# Patient Record
Sex: FEMALE | Race: WHITE | URBAN - METROPOLITAN AREA
[De-identification: names, ages, dates, MRNs, and addresses within clinical notes are randomized per-mention and may not be internally consistent; named-entity substitution may affect disease eponyms.]

---

## 2019-09-12 ENCOUNTER — OFFICE VISIT (OUTPATIENT)
Dept: FAMILY MEDICINE | Facility: CLINIC | Age: 34
End: 2019-09-12

## 2019-09-12 VITALS
RESPIRATION RATE: 15 BRPM | WEIGHT: 149.8 LBS | DIASTOLIC BLOOD PRESSURE: 86 MMHG | HEART RATE: 90 BPM | OXYGEN SATURATION: 99 % | HEIGHT: 62 IN | BODY MASS INDEX: 27.57 KG/M2 | SYSTOLIC BLOOD PRESSURE: 124 MMHG

## 2019-09-12 DIAGNOSIS — R20.2 PARESTHESIAS: Primary | ICD-10-CM

## 2019-09-12 DIAGNOSIS — K59.00 CONSTIPATION, UNSPECIFIED CONSTIPATION TYPE: ICD-10-CM

## 2019-09-12 DIAGNOSIS — L65.9 HAIR LOSS: ICD-10-CM

## 2019-09-12 DIAGNOSIS — R68.89 COLD INTOLERANCE: ICD-10-CM

## 2019-09-12 DIAGNOSIS — R53.83 FATIGUE, UNSPECIFIED TYPE: ICD-10-CM

## 2019-09-12 LAB
% GRANULOCYTES: 61.8 % (ref 42.2–75.2)
HCT VFR BLD AUTO: 42.8 % (ref 35–46)
HEMOGLOBIN: 13.8 G/DL (ref 11.8–15.5)
LYMPHOCYTES NFR BLD AUTO: 31.4 % (ref 20.5–51.1)
MCH RBC QN AUTO: 30.2 PG (ref 27–31)
MCHC RBC AUTO-ENTMCNC: 32.2 G/DL (ref 33–37)
MCV RBC AUTO: 93.7 FL (ref 80–100)
MONOCYTES NFR BLD AUTO: 6.8 % (ref 1.7–9.3)
PLATELET # BLD AUTO: 285 K/UL (ref 140–450)
RBC # BLD AUTO: 4.57 X10/CMM (ref 3.7–5.2)
WBC # BLD AUTO: 8.7 X10/CMM (ref 3.8–11)

## 2019-09-12 PROCEDURE — 99214 OFFICE O/P EST MOD 30 MIN: CPT | Performed by: FAMILY MEDICINE

## 2019-09-12 PROCEDURE — 85025 COMPLETE CBC W/AUTO DIFF WBC: CPT | Performed by: FAMILY MEDICINE

## 2019-09-12 PROCEDURE — 36415 COLL VENOUS BLD VENIPUNCTURE: CPT | Performed by: FAMILY MEDICINE

## 2019-09-12 ASSESSMENT — PATIENT HEALTH QUESTIONNAIRE - PHQ9
5. POOR APPETITE OR OVEREATING: SEVERAL DAYS
SUM OF ALL RESPONSES TO PHQ QUESTIONS 1-9: 5

## 2019-09-12 ASSESSMENT — ANXIETY QUESTIONNAIRES
IF YOU CHECKED OFF ANY PROBLEMS ON THIS QUESTIONNAIRE, HOW DIFFICULT HAVE THESE PROBLEMS MADE IT FOR YOU TO DO YOUR WORK, TAKE CARE OF THINGS AT HOME, OR GET ALONG WITH OTHER PEOPLE: SOMEWHAT DIFFICULT
7. FEELING AFRAID AS IF SOMETHING AWFUL MIGHT HAPPEN: NOT AT ALL
3. WORRYING TOO MUCH ABOUT DIFFERENT THINGS: SEVERAL DAYS
GAD7 TOTAL SCORE: 3
5. BEING SO RESTLESS THAT IT IS HARD TO SIT STILL: NOT AT ALL
2. NOT BEING ABLE TO STOP OR CONTROL WORRYING: NOT AT ALL
6. BECOMING EASILY ANNOYED OR IRRITABLE: SEVERAL DAYS
1. FEELING NERVOUS, ANXIOUS, OR ON EDGE: NOT AT ALL

## 2019-09-12 NOTE — PROGRESS NOTES
"Problem(s) Oriented visit        SUBJECTIVE:                                                    Willow Rose is a 33 year old female who presents to clinic today for establishing care.    She also complains of ongoing symptoms of hair loss, difficulty regulating temperature, and peripheral neuropathy. She complains of \"nerve pain\" that can last just a few moments but occurs in various body parts and feels like needles jabbing her. Symptoms seem worse with stress. Sleep quality is described as \"okay\" but wakes commonly and can be up for 1-2 hours. Her hands can get very cold even on a warm day. She is fatigued always. She denies any heavy menses. She has very dry eyes and they are commonly bloodshot. She can have episodes of blurry vision and \"floaters.\" It has been a few years since her last vision test. No migraines, some infrequent tension headaches. She has been very good with diet and exercised and cannot lose any weight. She has had some episodes of vertigo that did resolve. She alternates between constipation and diarrhea.       Problem list, Medication list, Allergies, and Medical/Social/Surgical histories reviewed in EPIC and updated as appropriate.   Additional history: as documented    ROS:  5 point ROS completed and negative except noted above, including Gen, CV, Resp, GI, MS      Histories:   Patient Active Problem List   Diagnosis     Contraceptive management     CARDIOVASCULAR SCREENING; LDL GOAL LESS THAN 160     Past Surgical History:   Procedure Laterality Date     ORTHOPEDIC SURGERY  2000    fx rigth metarasals     ORTHOPEDIC SURGERY  2007    removal of ortho hardware rigth foot       Social History     Tobacco Use     Smoking status: Never Smoker     Smokeless tobacco: Never Used   Substance Use Topics     Alcohol use: Yes     Comment: 3-4 a month     Family History   Problem Relation Age of Onset     Hypertension Mother      Congenital Anomalies Brother      Thyroid Disease Maternal Aunt  " "    Thyroid Disease Maternal Aunt      Thyroid Disease Maternal Aunt      Diabetes Maternal Aunt            OBJECTIVE:                                                    /86   Pulse 90   Resp 15   Ht 1.575 m (5' 2\")   Wt 67.9 kg (149 lb 12.8 oz)   LMP 09/02/2019   SpO2 99%   BMI 27.40 kg/m    Body mass index is 27.4 kg/m .   GENERAL APPEARANCE: Alert, no acute distress  EYES: PERRL, EOM normal, conjunctiva and lids normal  HENT: Ears and TMs normal, oral mucosa and posterior oropharynx normal  NECK: No adenopathy,masses or thyromegaly  RESP: lungs clear to auscultation   CV: normal rate, regular rhythm, no murmur or gallop  ABDOMEN: soft, no organomegaly, masses or tenderness  MS: extremities normal, no peripheral edema  NEURO: Alert, oriented, speech and mentation normal, Cranial nerves 2-12 are normal., Strength normal and symmetrical in upper and lower extremities., Sensation is normal in UE and LE, negative Rhomberg, no pronator drift, normal rapid alternating movements, reflexes normal  PSYCH: mentation appears normal, affect and mood normal   Labs Resulted Today:   Results for orders placed or performed in visit on 09/12/19   Vitamin D  25-Hydroxy ([facility])   Result Value Ref Range    Vitamin D,25-Hydroxy 32.3 30.0 - 100.0 ng/mL    Narrative    Performed at:  01 - [facility] [address], [place], [place]  [id number]  : Kian Mays MD, Phone:  [phone]   Vitamin B12 ([facility])   Result Value Ref Range    Vitamin B12 533 232 - 1,245 pg/mL    Narrative    Performed at:  01 - [facility] [address], [place], [place]  [id number]  : Kian Mays MD, Phone:  [phone]   IgA+t-Lema ([facility])   Result Value Ref Range     87 - 352 mg/dL    Tissue Transglutaminase Antibody IgA <2 0 - 3 U/mL    Narrative    Performed at:  01 - [facility] [address], [place], [place]  [id number]  : Kian Mays MD, Phone:  [phone]  Performed at:  02 - " [facility]  [id number]  : Lisa Chong MD, Phone:  [phone]   TSH ([facility])   Result Value Ref Range    TSH 3.100 0.450 - 4.500 uIU/mL    Narrative    Performed at:  01 - [facility] [address], [place], [place]  [id number]  : Kian Mays MD, Phone:  [phone]   Thyroxine (T4) Free  Direct  S ([facility])   Result Value Ref Range    T4 Free 1.12 0.82 - 1.77 ng/dL    Narrative    Performed at:  01 - [facility] [address], [place], [place]  [id number]  : Kian Mays MD, Phone:  [phone]   Triiodothyronine (T3) ([facility])   Result Value Ref Range    Triiodothyronine (T3) 85 71 - 180 ng/dL    Narrative    Performed at:  01 - [facility] [address], [place], [place]  [id number]  : Kian Mays MD, Phone:  [phone]   CBC with Diff/Plt ([facility])   Result Value Ref Range    WBC x10/cmm 8.7 3.8 - 11.0 x10/cmm    % Lymphocytes 31.4 20.5 - 51.1 %    % Monocytes 6.8 1.7 - 9.3 %    % Granulocytes 61.8 42.2 - 75.2 %    RBC x10/cmm 4.57 3.7 - 5.2 x10/cmm    Hemoglobin 13.8 11.8 - 15.5 g/dl    Hematocrit 42.8 35 - 46 %    MCV 93.7 80 - 100 fL    MCH 30.2 27.0 - 31.0 pg    MCHC 32.2 (A) 33.0 - 37.0 g/dL    Platelet Count 285 140 - 450 K/uL   Iron and TIBC ([facility])   Result Value Ref Range    Iron Binding Cap 253 250 - 450 ug/dL    UIBC 196 131 - 425 ug/dL    Iron 57 27 - 159 ug/dL    Iron Saturation 23 15 - 55 %    Narrative    Performed at:  01 - [facility] [address], [place], [place]  [id number]  : Kian Mays MD, Phone:  [phone]   Comp. Metabolic Panel (14) ([facility])   Result Value Ref Range    Glucose 78 65 - 99 mg/dL    Urea Nitrogen 7 6 - 20 mg/dL    Creatinine 0.91 0.57 - 1.00 mg/dL    eGFR If NonAfricn Am 83 >59 mL/min/1.73    eGFR If Africn Am 96 >59 mL/min/1.73    BUN/Creatinine Ratio 8 (L) 9 - 23    Sodium 138 134 - 144 mmol/L    Potassium 4.0 3.5 - 5.2 mmol/L    Chloride 100 96 - 106  mmol/L    Total CO2 25 20 - 29 mmol/L    Calcium 9.1 8.7 - 10.2 mg/dL    Protein Total 6.3 6.0 - 8.5 g/dL    Albumin 4.0 3.5 - 5.5 g/dL    Globulin, Total 2.3 1.5 - 4.5 g/dL    A/G Ratio 1.7 1.2 - 2.2    Bilirubin Total <0.2 0.0 - 1.2 mg/dL    Alkaline Phosphatase 48 39 - 117 IU/L    AST 15 0 - 40 IU/L    ALT 10 0 - 32 IU/L    Narrative    Performed at:  01 - [facility] [address], [place], [place]  [id number]  : Kian Mays MD, Phone:  [phone]     ASSESSMENT/PLAN:                                                        Willow was seen today for new patient and consult.    Diagnoses and all orders for this visit:    Paresthesias  -     Vitamin B12 ([facility])  -     Comp. Metabolic Panel (14) ([facility])  -     Referral to [facility] for MRI of the brain.   Given her constellation of symptoms I am concerned about MS.    Fatigue, unspecified type  -     Vitamin D  25-Hydroxy ([facility])  -     CBC with Diff/Plt ([facility])  -     Iron and TIBC ([facility])  -     Comp. Metabolic Panel (14) ([facility])  Check for anemia, vitamin deficiency, iron deficiency.     Hair loss / Cold intolerance  -     TSH ([facility])  -     Thyroxine (T4) Free  Direct  S ([facility])  -     Triiodothyronine (T3) ([facility])  -     Comp. Metabolic Panel (14) ([facility])  Rule out thyroid disease, nutrition issue.    Constipation, unspecified constipation type  -     IgA+t-Lema ([facility])  Recommend increase water intake, increased fiber in diet, rule out celiac disease.      There are no Patient Instructions on file for this visit.    The following health maintenance items are reviewed in Epic and correct as of today:  Health Maintenance   Topic Date Due     HIV SCREENING  10/25/2000     HPV  10/25/2006     PREVENTIVE CARE VISIT  04/04/2012     PAP  04/04/2016     INFLUENZA VACCINE (1) 09/01/2019     DTAP/TDAP/TD IMMUNIZATION (7 - Td) 04/18/2026     PHQ-2  Completed     IPV IMMUNIZATION  Completed     MENINGITIS IMMUNIZATION  Aged Out        June Castellano MD  [facility]  [phone]    For any issues my office # is [phone]

## 2019-09-12 NOTE — LETTER
[facility]  [address] [place] [id number] [phone]      September 12, 2019      Willow Rose  [address]  [facility] [address]      EMERGENCY CARE PLAN  Presenting Problem Treatment Plan   Questions or concerns during clinic hours I will call the clinic directly:    [facility]  [address] [place], [place] [address] [phone]   Questions or concerns outside clinic hours I will call the after-hours on-call line at [phone]   Patient needs to schedule an appointment I will call the scheduling team during business hours at [phone]   Same day treatment  I will call the clinic first, then urgent care and express care if needed   Clinic Care Coordinators EMILIO Shafer:  [phone]  Leona Lowry RN: [phone]   Crisis Services:  Behavioral or Mental Health [facility]   [phone]   Emergency treatment--Immediately CALL [phone]

## 2019-09-13 ASSESSMENT — ANXIETY QUESTIONNAIRES: GAD7 TOTAL SCORE: 3

## 2019-09-14 LAB
ALBUMIN SERPL-MCNC: 4 G/DL (ref 3.5–5.5)
ALBUMIN/GLOB SERPL: 1.7 {RATIO} (ref 1.2–2.2)
ALP SERPL-CCNC: 48 IU/L (ref 39–117)
ALT SERPL-CCNC: 10 IU/L (ref 0–32)
AST SERPL-CCNC: 15 IU/L (ref 0–40)
BILIRUB SERPL-MCNC: <0.2 MG/DL (ref 0–1.2)
BUN SERPL-MCNC: 7 MG/DL (ref 6–20)
BUN/CREATININE RATIO: 8 (ref 9–23)
CALCIUM SERPL-MCNC: 9.1 MG/DL (ref 8.7–10.2)
CHLORIDE SERPLBLD-SCNC: 100 MMOL/L (ref 96–106)
CREAT SERPL-MCNC: 0.91 MG/DL (ref 0.57–1)
EGFR IF AFRICN AM: 96 ML/MIN/1.73
EGFR IF NONAFRICN AM: 83 ML/MIN/1.73
GLOBULIN, TOTAL: 2.3 G/DL (ref 1.5–4.5)
GLUCOSE SERPL-MCNC: 78 MG/DL (ref 65–99)
IRON BINDING CAP: 253 UG/DL (ref 250–450)
IRON SATURATION: 23 % (ref 15–55)
IRON: 57 UG/DL (ref 27–159)
POTASSIUM SERPL-SCNC: 4 MMOL/L (ref 3.5–5.2)
PROT SERPL-MCNC: 6.3 G/DL (ref 6–8.5)
SODIUM SERPL-SCNC: 138 MMOL/L (ref 134–144)
T3 SERPL-MCNC: 85 NG/DL (ref 71–180)
T4 FREE SERPL-MCNC: 1.12 NG/DL (ref 0.82–1.77)
TOTAL CO2: 25 MMOL/L (ref 20–29)
TSH BLD-ACNC: 3.1 UIU/ML (ref 0.45–4.5)
UIBC: 196 UG/DL (ref 131–425)
VIT B12 SERPL-MCNC: 533 PG/ML (ref 232–1245)
VITAMIN D, 25-HYDROXY: 32.3 NG/ML (ref 30–100)

## 2019-09-16 LAB
IGA: 250 MG/DL (ref 87–352)
TISSUE TRANSGLUTAMINASE ABY IGA: <2 U/ML (ref 0–3)

## 2019-09-17 NOTE — PROGRESS NOTES
MRI of brain needed prior authorization.  Called [facility] at [phone] to give them more information. This did get approved for CPT code 15324. Authorization [id number]. Valid 09/17/19-11/01/19.  Called to inform [facility].

## 2019-09-20 ENCOUNTER — TRANSFERRED RECORDS (OUTPATIENT)
Dept: FAMILY MEDICINE | Facility: CLINIC | Age: 34
End: 2019-09-20

## 2019-09-23 ENCOUNTER — TELEPHONE (OUTPATIENT)
Dept: FAMILY MEDICINE | Facility: CLINIC | Age: 34
End: 2019-09-23

## 2019-09-23 DIAGNOSIS — R20.2 PARESTHESIAS: Primary | ICD-10-CM

## 2019-09-23 NOTE — TELEPHONE ENCOUNTER
Called patient with result of brain MRI.  Informed her of normal MRI.  Recommended is referral to neurologist to help patient with her symptoms.  Referral sent to [facility]. They will call patient to schedule consult. Faxed over labs and MRI result.

## 2019-11-03 ENCOUNTER — HEALTH MAINTENANCE LETTER (OUTPATIENT)
Age: 34
End: 2019-11-03

## 2020-11-16 ENCOUNTER — HEALTH MAINTENANCE LETTER (OUTPATIENT)
Age: 35
End: 2020-11-16

## 2021-09-18 ENCOUNTER — HEALTH MAINTENANCE LETTER (OUTPATIENT)
Age: 36
End: 2021-09-18

## 2022-01-08 ENCOUNTER — HEALTH MAINTENANCE LETTER (OUTPATIENT)
Age: 37
End: 2022-01-08

## 2022-11-20 ENCOUNTER — HEALTH MAINTENANCE LETTER (OUTPATIENT)
Age: 37
End: 2022-11-20

## 2023-04-15 ENCOUNTER — HEALTH MAINTENANCE LETTER (OUTPATIENT)
Age: 38
End: 2023-04-15